# Patient Record
(demographics unavailable — no encounter records)

---

## 2024-10-11 NOTE — ASSESSMENT
[FreeTextEntry1] : ADD: refill sent for Methylphenidate ER 18 mg daily  reviewed  advised to consider re-establishing with psychiatry   Anxiety/Depression/Insomnia: c/w Sertraline, Trazodone   Obesity: consider GLP-1 Agonists, such as Wegovy and Zepbound patient advised to check her insurance regarding medication coverage, if covered, advised to schedule a follow up visit to discuss further   HCM: Flu vaccine administered, patient tolerated well

## 2024-10-11 NOTE — HISTORY OF PRESENT ILLNESS
[FreeTextEntry1] : Follow up- medication refill  [de-identified] : 29 year old female with h/o ADD, Anxiety, Depression, and Insomnia presents for a follow up. She had switched to another PCP about 2 years ago. At the time, she had to stop following with her psychiatrist due to insurance issues. Her prescriptions have since been managed by her PCP. She is currently on Methylphenidate ER 18 mg daily for ADD, which helps keep her focused and organized. She had been on Adderall ER but had to switch due to medication back order. She is also on Sertraline 125 mg daily for Anxiety and Depression and Trazodone 100 mg at night to help with sleep. She follows with a therapist regularly. She is here today, looking to switch her care back to our office and requesting a refill for her Methylphenidate.   She also agrees for a flu vaccine, no fever  Has h/o Obesity- inquiring about weight loss medication   [id number]

## 2024-10-11 NOTE — HEALTH RISK ASSESSMENT
[Never] : Never [0] : 2) Feeling down, depressed, or hopeless: Not at all (0) [PHQ-2 Negative - No further assessment needed] : PHQ-2 Negative - No further assessment needed [[id number]] : 0

## 2024-12-02 NOTE — HEALTH RISK ASSESSMENT
Is This A New Presentation, Or A Follow-Up?: Follow Up Hyperpigmentation How Severe Is It?: mild [Former] : Former Additional History: Patient says she has not been using try hydro ret since she went through her breast cancer treatment. She had a partial mastectomy in may and had to have radiation and she thinks the discoloration has gotten worse and her skin is very dry since then . Patient says her armpit on the side where they did the mastectomy burns but this recently started. [0-4] : 0-4 [< 15 Years] : < 15 Years

## 2024-12-02 NOTE — HISTORY OF PRESENT ILLNESS
[FreeTextEntry1] : Follow up  [de-identified] : 29 year old female with h/o ADD, Anxiety, Depression, and Insomnia presents for a follow up. She is on Methylphenidate ER 18 mg daily, which she takes at around 3:30 pm. She works from 4 pm until 1 am for the [employer]. She finds the effects of the medication wear off by 10 pm- at that time, she starts to lose focus and finds herself easily distracted. She is looking for dose adjustment to her medication. She is on Sertraline 125 mg daily for Anxiety and Depression and Trazodone 100 mg at night to help with sleep. She follows with a therapist regularly.   Has h/o Obesity- reports Wegovy is covered by her insurance she is interested in starting medication for weight loss  she has been trying to improve upon her diet and exercise regimen

## 2024-12-02 NOTE — ASSESSMENT
[FreeTextEntry1] : ADD: refill recently sent for Methylphenidate ER 18 mg daily will look to add on Methylphenidate 5 mg- which patient can take as needed later on in the evening   reviewed  if this additional dose is not effective- will look to increase her regular dose to Methylphenidate ER 27 mg daily advised to consider re-establishing with psychiatry   Anxiety/Depression/Insomnia: c/w Sertraline, Trazodone  refill for Trazodone sent   Obesity: Patient would benefit from GLP therapy as they have not been successful with lifestyle modifications such as reduced calorie diet and exercise regimen. Despite modifications made, patients BMI remains at 40.67. Starting Wegovy as an adjunct to diet and exercise would assist the patient with significant weight loss and ultimately improve health outcomes by reducing the risk of developing weight related health conditions.  Will look to start Wegovy, risks and benefits discussed, advised on potential side effects of nausea, reflux, constipation, pancreatitis, advised medication is contraindicated with a personal and/or family h/o medullary thyroid cancer. Will start Wegovy 0.25 mg weekly x 1 month and then increase to 0.5 mg weekly. Advised to continue to titrate medication until she reaches 2.4 mg weekly dose. Continue to focus on diet and exercise. Monitor.  return as needed and f/u in office in 2 months can call back with updates regarding her ADD medication and Wegovy for next month

## 2025-03-03 NOTE — HEALTH RISK ASSESSMENT
[2] : 2) Feeling down, depressed, or hopeless for more than half of the days (2) [PHQ-2 Positive] : PHQ-2 Positive [1/2 of Days or More (2)] : 4.) Feeling tired or having little energy? Half the days or more [Nearly Every Day (3)] : 5.) Poor appetite or overeating? Nearly every day [Several Days (1)] : 7.) Trouble concentrating on things, such as reading a newspaper or watching television? Several days [Not at All (0)] : 9.) Thoughts that you would be off dead or of hurting yourself in some way? Not at all [Moderate] : Severity of Depression is Moderate [Somewhat Difficult] : How difficult have these problems made it for you to do your work, take care of things at home, or get along with people? Somewhat difficult [PHQ-9 Positive] : PHQ-9 Positive [Former] : Former [0-4] : 0-4 [< 15 Years] : < 15 Years [[id number]] : 4 [[id number]] : 10

## 2025-03-03 NOTE — HISTORY OF PRESENT ILLNESS
[FreeTextEntry1] : Follow up [de-identified] : 29 year old female with h/o ADD, Anxiety, Depression, Insomnia, Obesity presents for a follow up. She is on Methylphenidate ER 27 mg daily (dose had been increased recently). She reports losing her job recently (had been working for the [employer]). She is currently searching for a new job. As such- her medical insurance will be changing. She reports her current dose of Methylphenidate has been helping- feels more focused. She is on Sertraline 125 mg daily for Anxiety and Depression and Trazodone 100 mg at night to help with sleep. She feels she needs a dose adjustment with her Trazodone- having difficulty falling asleep. She follows with a therapist regularly.   Has h/o Obesity- had been interested in starting GLP-1 Agonists due to insurance change- will likely not be covered by her new insurance interested in bariatric surgery started Pilates for exercise   c/o intermittent pain to her left calf for the past 2 months no injury or trauma no recent travel history  no chest pain, no shortness of breath feels this could be related to her underlying varicose veins

## 2025-03-03 NOTE — PHYSICAL EXAM
[No Acute Distress] : no acute distress [Well Nourished] : well nourished [Well Developed] : well developed [Well-Appearing] : well-appearing [Normal Appearance] : was normal in appearance [Neck Supple] : was supple [No Respiratory Distress] : no respiratory distress  [Clear to Auscultation] : lungs were clear to auscultation bilaterally [Normal Rate] : normal rate  [Regular Rhythm] : with a regular rhythm [Normal S1, S2] : normal S1 and S2 [No Murmur] : no murmur heard [No Edema] : there was no peripheral edema [Alert and Oriented x3] : oriented to person, place, and time [Normal Insight/Judgement] : insight and judgment were intact [de-identified] : + varicose veins to bilateral lower legs [de-identified] : no tenderness to left posterior leg upon palpation

## 2025-03-03 NOTE — ASSESSMENT
[FreeTextEntry1] : ADD: c/w Methylphenidate ER 27 mg daily (refill not yet due),  reviewed   Anxiety/Depression/Insomnia: c/w Sertraline 125 mg daily c/w Trazodone, will look to increase dose, can try 150 mg at bedtime first, if no helpful, then increase to 200 mg at bedtime recommend she connect to psychiatry, can consult with our behavioral health counselor to help connect to a psychiatrist   Obesity: referred to bariatric surgeon to discuss surgical options continue to improve upon diet and exercise as tolerated  Left lower leg pain: declines referral for ultrasound referred to vascular surgery   Varicose veins: referred to vascular surgery recommend compression stockings  HCM: referred for updated blood work